# Patient Record
Sex: FEMALE | URBAN - METROPOLITAN AREA
[De-identification: names, ages, dates, MRNs, and addresses within clinical notes are randomized per-mention and may not be internally consistent; named-entity substitution may affect disease eponyms.]

---

## 2023-11-26 ENCOUNTER — V-VISIT (OUTPATIENT)
Dept: URGENT CARE | Age: 38
End: 2023-11-26

## 2023-11-26 VITALS
SYSTOLIC BLOOD PRESSURE: 108 MMHG | BODY MASS INDEX: 28.25 KG/M2 | HEART RATE: 60 BPM | WEIGHT: 180 LBS | HEIGHT: 67 IN | DIASTOLIC BLOOD PRESSURE: 68 MMHG | OXYGEN SATURATION: 98 % | RESPIRATION RATE: 18 BRPM | TEMPERATURE: 97.4 F

## 2023-11-26 DIAGNOSIS — J06.9 ACUTE UPPER RESPIRATORY INFECTION: ICD-10-CM

## 2023-11-26 DIAGNOSIS — H66.001 NON-RECURRENT ACUTE SUPPURATIVE OTITIS MEDIA OF RIGHT EAR WITHOUT SPONTANEOUS RUPTURE OF TYMPANIC MEMBRANE: Primary | ICD-10-CM

## 2023-11-26 DIAGNOSIS — J01.90 ACUTE BACTERIAL RHINOSINUSITIS: ICD-10-CM

## 2023-11-26 DIAGNOSIS — B96.89 ACUTE BACTERIAL RHINOSINUSITIS: ICD-10-CM

## 2023-11-26 PROCEDURE — 99203 OFFICE O/P NEW LOW 30 MIN: CPT | Performed by: NURSE PRACTITIONER

## 2023-11-26 RX ORDER — METHYLPREDNISOLONE 4 MG/1
4 TABLET ORAL SEE ADMIN INSTRUCTIONS
Qty: 21 TABLET | Refills: 0 | Status: SHIPPED | OUTPATIENT
Start: 2023-11-26

## 2023-11-26 RX ORDER — AMOXICILLIN AND CLAVULANATE POTASSIUM 875; 125 MG/1; MG/1
1 TABLET, FILM COATED ORAL EVERY 12 HOURS
Qty: 14 TABLET | Refills: 0 | Status: SHIPPED | OUTPATIENT
Start: 2023-11-26 | End: 2023-12-03

## 2023-11-26 RX ORDER — ESCITALOPRAM OXALATE 10 MG/1
1 TABLET ORAL DAILY
COMMUNITY

## 2023-11-26 ASSESSMENT — ENCOUNTER SYMPTOMS
SHORTNESS OF BREATH: 0
CHEST TIGHTNESS: 0
SORE THROAT: 1
EYES NEGATIVE: 1
FATIGUE: 1
COUGH: 1
WHEEZING: 0
CHILLS: 1

## 2023-11-26 ASSESSMENT — PAIN SCALES - GENERAL: PAINLEVEL: 5

## 2024-06-13 ENCOUNTER — HOSPITAL ENCOUNTER (OUTPATIENT)
Age: 39
Discharge: HOME | End: 2024-06-13
Payer: COMMERCIAL

## 2024-06-13 VITALS
RESPIRATION RATE: 16 BRPM | SYSTOLIC BLOOD PRESSURE: 99 MMHG | OXYGEN SATURATION: 100 % | HEART RATE: 60 BPM | DIASTOLIC BLOOD PRESSURE: 69 MMHG | TEMPERATURE: 97 F

## 2024-06-13 DIAGNOSIS — M70.21 OLECRANON BURSITIS OF RIGHT ELBOW: Primary | ICD-10-CM

## 2024-06-13 PROCEDURE — 99203 OFFICE O/P NEW LOW 30 MIN: CPT | Performed by: NURSE PRACTITIONER

## 2024-06-13 NOTE — ED PROVIDER NOTES
Patient Seen in: [facility]      History     Chief Complaint   Patient presents with    Arm or Hand Injury     Stated Complaint: Elbow Problem Pain    Subjective: 38-year-old female, presents to immediate care with complaints of right elbow pain and swelling x 1 week.  Denies any fall or trauma.  She does state that she plays tennis and initially thought it was \"tennis elbow\".  She has taken Tylenol intermittently.  However, notes that it is not painful.  No ice application.  No fever, chills, fatigue.  She is well-appearing.  Not ill or toxic appearing.  AOx4  The history is provided by the patient.           Objective:   Past Medical History:    Varicella              History reviewed. No pertinent surgical history.             Social History     Socioeconomic History    Marital status:    Tobacco Use    Smoking status: Never    Smokeless tobacco: Never   Substance and Sexual Activity    Alcohol use: No    Drug use: No    Sexual activity: Yes     Partners: Male     Comment: Current 3/19/2015              Review of Systems   Constitutional: Negative.  Negative for chills, fatigue and fever.   Musculoskeletal:  Positive for arthralgias and joint swelling.   Skin:  Positive for color change. Negative for rash and wound.       Positive for stated complaint: Elbow Problem Pain  Other systems are as noted in HPI.  Constitutional and vital signs reviewed.      All other systems reviewed and negative except as noted above.    Physical Exam     ED Triage Vitals [06/13/24 1000]   BP 99/69   Pulse 60   Resp 16   Temp 97.4 °F (36.3 °C)   Temp src Temporal   SpO2 100 %   O2 Device None (Room air)       Current Vitals:   Vital Signs  BP: 99/69  Pulse: 60  Resp: 16  Temp: 97.4 °F (36.3 °C)  Temp src: Temporal    Oxygen Therapy  SpO2: 100 %  O2 Device: None (Room air)            Physical Exam  Constitutional:       General: She is not in acute distress.     Appearance: Normal appearance. She is not  ill-appearing.   HENT:      Head: Normocephalic.   Cardiovascular:      Rate and Rhythm: Normal rate.      Pulses: Normal pulses.   Pulmonary:      Effort: Pulmonary effort is normal.   Musculoskeletal:         General: Swelling present. No tenderness or signs of injury. Normal range of motion.      Right elbow: Swelling present. No effusion. No tenderness.        Arms:    Skin:     Capillary Refill: Capillary refill takes less than 2 seconds.      Findings: Erythema present.   Neurological:      General: No focal deficit present.      Mental Status: She is alert and oriented to person, place, and time.               ED Course   Labs Reviewed - No data to display                   MDM        Differentials considered include: Tennis elbow, bursitis, bug bite with secondary cellulitis, septic arthritis.    Patient has no systemic signs or symptoms of septic arthritis.  No limitation of range of motion of elbow.  Positive erythema without warmth.  Very very low suspicion for septic arthritis.    Very low suspicion for cellulitis.  Area is localized to elbow only.  Positive erythema without warmth.  No indurated skin.  Patient has no history of cellulitis.    Bursitis suspected based on patient's frequent repetitive movements and tenderness.  Additionally,, location of bursitis.  There is bursal fluid on examination.    Withheld any imaging due to lack of trauma.  Educated patient on RICE and NSAIDs.  She feels comfortable with over-the-counter NSAIDs.  Encourage patient to avoid trauma, repetitive movements and excessive pressure to elbow.  She is aware of signs symptoms that warrant reevaluation: Worsening swelling, pain, fever, limitation of range of motion.    Patient was given the name of a physiatrist that could either inject steroids into the side or drain if area grows in size and patient would like it to be a statically treated.    Patient verbalized understanding discharge education.  All questions answered at  time of discharge.                                 Medical Decision Making      Disposition and Plan     Clinical Impression:  1. Olecranon bursitis of right elbow         Disposition:  Discharge  6/13/2024 10:11 am    Follow-up:  Jamir Walters MD  [address] [place], [place] [id number]  [phone]    Schedule an appointment as soon as possible for a visit   As needed - this is a physiatrist          Medications Prescribed:  There are no discharge medications for this patient.

## 2024-06-13 NOTE — DISCHARGE INSTRUCTIONS
As discussed, you have a small bursitis of the elbow.  Please see attached discharge instructions.  Recommend that you purchase a compression sleeve.  Avoid repetitive movements, secondary trauma to elbow, putting pressure on your elbow.  Apply ice 4 times a day for at least 15 minutes.  You may take Tylenol and NSAIDs for any inflammation, swelling, discomfort.  Please follow-up with your PCP for reevaluation if needed.  You can return to immediate care if area becomes more red, swollen, hot and tender to touch, fevers, chills, fatigue, decreased range of motion of elbow.  I have given you the name of a physiatrist that you may follow-up with if needed.